# Patient Record
Sex: FEMALE | URBAN - NONMETROPOLITAN AREA
[De-identification: names, ages, dates, MRNs, and addresses within clinical notes are randomized per-mention and may not be internally consistent; named-entity substitution may affect disease eponyms.]

---

## 2024-10-10 ENCOUNTER — APPOINTMENT (RX ONLY)
Dept: URBAN - NONMETROPOLITAN AREA CLINIC 28 | Facility: CLINIC | Age: 24
Setting detail: DERMATOLOGY
End: 2024-10-10

## 2024-10-10 DIAGNOSIS — L20.89 OTHER ATOPIC DERMATITIS: ICD-10-CM | Status: INADEQUATELY CONTROLLED

## 2024-10-10 DIAGNOSIS — Z79.899 OTHER LONG TERM (CURRENT) DRUG THERAPY: ICD-10-CM

## 2024-10-10 PROCEDURE — 96372 THER/PROPH/DIAG INJ SC/IM: CPT

## 2024-10-10 PROCEDURE — ? DUPIXENT INITIATION

## 2024-10-10 PROCEDURE — 81025 URINE PREGNANCY TEST: CPT

## 2024-10-10 PROCEDURE — ? COUNSELING

## 2024-10-10 PROCEDURE — ? PRESCRIPTION

## 2024-10-10 PROCEDURE — ? URINE PREGNANCY TEST

## 2024-10-10 PROCEDURE — ? PRESCRIPTION MEDICATION MANAGEMENT

## 2024-10-10 PROCEDURE — 99202 OFFICE O/P NEW SF 15 MIN: CPT | Mod: 25

## 2024-10-10 PROCEDURE — ? DUPIXENT INJECTION

## 2024-10-10 RX ORDER — CRISABOROLE 20 MG/G
OINTMENT TOPICAL
Qty: 60 | Refills: 3 | Status: ERX | COMMUNITY
Start: 2024-10-10

## 2024-10-10 RX ORDER — DUPILUMAB 300 MG/2ML
INJECTION, SOLUTION SUBCUTANEOUS
Qty: 4 | Refills: 3 | Status: ERX | COMMUNITY
Start: 2024-10-10

## 2024-10-10 RX ADMIN — DUPILUMAB: 300 INJECTION, SOLUTION SUBCUTANEOUS at 00:00

## 2024-10-10 RX ADMIN — CRISABOROLE: 20 OINTMENT TOPICAL at 00:00

## 2024-10-10 ASSESSMENT — SEVERITY ASSESSMENT 2020: SEVERITY 2020: MODERATE

## 2024-10-10 ASSESSMENT — BSA ECZEMA: % BODY COVERED IN ECZEMA: 40

## 2024-10-10 ASSESSMENT — LOCATION DETAILED DESCRIPTION DERM
LOCATION DETAILED: RIGHT POPLITEAL SKIN
LOCATION DETAILED: RIGHT DISTAL POSTERIOR UPPER ARM
LOCATION DETAILED: RIGHT PROXIMAL POSTERIOR UPPER ARM
LOCATION DETAILED: LEFT ELBOW
LOCATION DETAILED: LEFT PROXIMAL POSTERIOR UPPER ARM
LOCATION DETAILED: LEFT POPLITEAL SKIN

## 2024-10-10 ASSESSMENT — LOCATION SIMPLE DESCRIPTION DERM
LOCATION SIMPLE: RIGHT POSTERIOR UPPER ARM
LOCATION SIMPLE: LEFT ELBOW
LOCATION SIMPLE: RIGHT POPLITEAL SKIN
LOCATION SIMPLE: LEFT POPLITEAL SKIN
LOCATION SIMPLE: LEFT POSTERIOR UPPER ARM

## 2024-10-10 ASSESSMENT — LOCATION ZONE DERM
LOCATION ZONE: ARM
LOCATION ZONE: LEG

## 2024-10-10 ASSESSMENT — ITCH NUMERIC RATING SCALE: HOW SEVERE IS YOUR ITCHING?: 8

## 2024-10-10 NOTE — PROCEDURE: DUPIXENT INJECTION
Date Of Next Injection: 2 Weeks
Consent: The risks of pain and injection site reactions were reviewed with the patient prior to the injection.
Lot # (Optional): [id number]
Render If Medication Purchased By Clinic In Visit Note?: Yes
Expiration Date (Optional): 2026-04-30
Syringe Size Used (Required For Enhanced Ndc): 300 mg/2ml prefilled pen
Was The Medication Purchased By The Clinic?: No
Dupixent Dosing: 300 mg
41880 Billing Preferences: 1
Detail Level: None
J-Code: 
Ndc (200 Mg Prefilled Pen): 6150-3138-09
Administered By (Optional): EPCCMA
Ndc (300 Mg Prefilled Pen): 1393-3692-14
Ndc (300 Mg Prefilled Syringe): 1968-2337-45
Ndc (200 Mg Prefilled Syringe): 9467-6108-11

## 2024-10-10 NOTE — PROCEDURE: PRESCRIPTION MEDICATION MANAGEMENT
Render In Strict Bullet Format?: No
Detail Level: Simple
Initiate Treatment: Eucrisa 2 % topical ointment \\nSig: Apply to affected areas of arms, legs, trunk once daily for 3-4 days. PRN \\n\\nDupixent 300 mg/2 mL subcutaneous pen injector \\nSig: Inject 300mg Sub Q every other week
Continue Regimen: T.c.s PRN for severe itching

## 2024-10-10 NOTE — PROCEDURE: DUPIXENT INITIATION
Diagnosis (Required): Atopic Dermatitis/Eczematous Dermatitis
Is Methotrexate Contraindicated?: Yes
Is Phototherapy Contraindicated?: No
Detail Level: Zone
Pregnancy And Lactation Warning Text: There have not been adverse fetal risks in women taking Dupixent while pregnant. It is unknown if this medication is excreted in breast milk.
Dupixent Dosing: 600 mg SC day 0 then 300 mg SC every other week
Dupixent Monitoring Guidelines: There is no laboratory monitoring requirement with Dupixent.

## 2024-10-10 NOTE — PROCEDURE: URINE PREGNANCY TEST
Performed By: JESSIE
Detail Level: None
Expiration Date (Optional): 2026-04-22
Urine Pregnancy Test Result: negative
Lot # (Optional): [id number]

## 2024-10-24 ENCOUNTER — APPOINTMENT (RX ONLY)
Dept: URBAN - NONMETROPOLITAN AREA CLINIC 28 | Facility: CLINIC | Age: 24
Setting detail: DERMATOLOGY
End: 2024-10-24

## 2024-10-24 DIAGNOSIS — L20.89 OTHER ATOPIC DERMATITIS: ICD-10-CM

## 2024-10-24 PROCEDURE — 96372 THER/PROPH/DIAG INJ SC/IM: CPT

## 2024-10-24 PROCEDURE — ? DUPIXENT INJECTION

## 2024-10-24 PROCEDURE — ? PRESCRIPTION MEDICATION MANAGEMENT

## 2024-10-24 ASSESSMENT — LOCATION DETAILED DESCRIPTION DERM: LOCATION DETAILED: RIGHT PROXIMAL POSTERIOR UPPER ARM

## 2024-10-24 ASSESSMENT — LOCATION ZONE DERM: LOCATION ZONE: ARM

## 2024-10-24 ASSESSMENT — LOCATION SIMPLE DESCRIPTION DERM: LOCATION SIMPLE: RIGHT POSTERIOR UPPER ARM

## 2024-10-24 NOTE — PROCEDURE: DUPIXENT INJECTION
Date Of Next Injection: 2 Weeks
Consent: The risks of pain and injection site reactions were reviewed with the patient prior to the injection.
Lot # (Optional): [id number]
Render If Medication Purchased By Clinic In Visit Note?: Yes
Expiration Date (Optional): 2026-04-30
Syringe Size Used (Required For Enhanced Ndc): 300 mg/2ml prefilled pen
Was The Medication Purchased By The Clinic?: No
Dupixent Dosing: 300 mg
64623 Billing Preferences: 1
Detail Level: None
J-Code: 
Ndc (200 Mg Prefilled Pen): 4528-2101-91
Treatment Number (Optional): 2
Administered By (Optional): ILYA
Ndc (300 Mg Prefilled Pen): 5792-3766-84
Ndc (300 Mg Prefilled Syringe): 5420-0736-19
Ndc (200 Mg Prefilled Syringe): 6673-1216-08

## 2024-11-19 RX ORDER — DUPILUMAB 300 MG/2ML
INJECTION, SOLUTION SUBCUTANEOUS
Qty: 4 | Refills: 3 | Status: ERX

## 2025-01-27 ENCOUNTER — APPOINTMENT (OUTPATIENT)
Dept: URBAN - NONMETROPOLITAN AREA CLINIC 28 | Facility: CLINIC | Age: 25
Setting detail: DERMATOLOGY
End: 2025-01-27

## 2025-01-27 DIAGNOSIS — L20.89 OTHER ATOPIC DERMATITIS: ICD-10-CM

## 2025-01-27 DIAGNOSIS — Z79.899 OTHER LONG TERM (CURRENT) DRUG THERAPY: ICD-10-CM

## 2025-01-27 PROCEDURE — 36415 COLL VENOUS BLD VENIPUNCTURE: CPT

## 2025-01-27 PROCEDURE — ? PRESCRIPTION MEDICATION MANAGEMENT

## 2025-01-27 PROCEDURE — 81025 URINE PREGNANCY TEST: CPT

## 2025-01-27 PROCEDURE — ? VENIPUNCTURE

## 2025-01-27 PROCEDURE — 99214 OFFICE O/P EST MOD 30 MIN: CPT

## 2025-01-27 PROCEDURE — ? DUPIXENT MONITORING

## 2025-01-27 PROCEDURE — ? PRESCRIPTION

## 2025-01-27 PROCEDURE — ? URINE PREGNANCY TEST

## 2025-01-27 PROCEDURE — ? RINVOQ INITIATION

## 2025-01-27 PROCEDURE — ? ORDER TESTS

## 2025-01-27 PROCEDURE — ? DIAGNOSIS COMMENT

## 2025-01-27 RX ORDER — UPADACITINIB 15 MG/1
TABLET, EXTENDED RELEASE ORAL
Qty: 30 | Refills: 6 | Status: ERX | COMMUNITY
Start: 2025-01-27

## 2025-01-27 RX ADMIN — UPADACITINIB: 15 TABLET, EXTENDED RELEASE ORAL at 00:00

## 2025-01-27 ASSESSMENT — LOCATION ZONE DERM
LOCATION ZONE: TRUNK
LOCATION ZONE: ARM

## 2025-01-27 ASSESSMENT — LOCATION SIMPLE DESCRIPTION DERM
LOCATION SIMPLE: RIGHT UPPER ARM
LOCATION SIMPLE: LOWER BACK

## 2025-01-27 ASSESSMENT — ITCH NUMERIC RATING SCALE: HOW SEVERE IS YOUR ITCHING?: 4

## 2025-01-27 ASSESSMENT — LOCATION DETAILED DESCRIPTION DERM
LOCATION DETAILED: SUPERIOR LUMBAR SPINE
LOCATION DETAILED: RIGHT ANTECUBITAL SKIN

## 2025-01-27 ASSESSMENT — SEVERITY ASSESSMENT 2020: SEVERITY 2020: MODERATE

## 2025-01-27 ASSESSMENT — BSA ECZEMA: % BODY COVERED IN ECZEMA: 10

## 2025-01-27 NOTE — PROCEDURE: URINE PREGNANCY TEST
Performed By: mary
Lot # (Optional): [id number]
Detail Level: None
Urine Pregnancy Test Result: negative
Expiration Date (Optional): 2026-04-22

## 2025-01-27 NOTE — PROCEDURE: PRESCRIPTION MEDICATION MANAGEMENT
Plan: preventative measures reviewed including measures to improve skin condition. These include soap, sensitive products. \\nAvoid heavy, perfumes, and chemicals. Moisturizing skin with a at least 1 to 2 times a day. \\nAvoid itching super long, super hot showers and getting overheated anything that might irritate the skin. \\nrecommend Zyrtec or Allegra daily.
Render In Strict Bullet Format?: No
Detail Level: Simple
Initiate Treatment: Rinvoq daily
Continue Regimen: T.c.s PRN for severe itching 1-2/day for up to 2 weeks \\n\\nEucrisa 2 % topical ointment  Sig: Apply to affected areas of arms, legs, trunk once daily for 3-4 days. PRN
Discontinue Regimen: Dupixent 300 mg/2 mL subcutaneous pen injector  Sig: Inject 300mg Sub Q every other week

## 2025-01-27 NOTE — PROCEDURE: RINVOQ INITIATION
Rinvoq Dosing: 15mg Daily
Is Methotrexate Contraindicated?: No
Pregnancy And Lactation Warning Text: Based on animal studies, Rinvoq may cause embryo-fetal harm when administered to pregnant women.  The medication should not be used in pregnancy.  Breastfeeding is not recommended during treatment and for 6 days after the last dose.
Diagnosis (Required): Atopic Dermatitis/Eczematous Dermatitis
Rinvoq Monitoring Guidelines: CBC, LFTs, lipids, hepatitis screening, TB screening and pregnancy tests should be checked prior to initiating Rinvoq therapy.  Labs may also be checked periodically after the initiation period.
Detail Level: Zone
Add Pregnancy And Lactation Warning To Medication Counseling?: Yes

## 2025-01-27 NOTE — PROCEDURE: VENIPUNCTURE
Bill [id number] For Specimen Handling/Conveyance To Laboratory?: no
Number Of Tubes Drawn: 3
Venipuncture Paragraph: An alcohol pad was applied to the venipuncture site. Venipuncture was performed using a butterfly needle. Pressure and a bandaid was applied to the site. No complications were noted.
Detail Level: None

## 2025-01-27 NOTE — PROCEDURE: DUPIXENT MONITORING
Comments: Will Discontinue due to still having rash post injection.
Add High Risk Medication Management Associated Diagnosis?: No
Length Of Therapy: 4 months
Detail Level: Zone

## 2025-01-27 NOTE — PROCEDURE: DIAGNOSIS COMMENT
Comment: Appearing to have flares and flushing to upper chest and neck within 24hr from dupixent injection each time. A/e noted and recommend discontinue dupixent and try alternate class of medication. IL 13 such as dupixent and ebglyss potential for a/e. Will get labs and start trial with rinvoq today. Last injection 2 weeks ago. \\nItch scale today 4/10 BSA 10, residual PIH to neck area still present.
Render Risk Assessment In Note?: no
Detail Level: Detailed

## 2025-02-11 RX ORDER — UPADACITINIB 15 MG/1
TABLET, EXTENDED RELEASE ORAL
Qty: 30 | Refills: 6 | Status: ERX

## 2025-02-28 ENCOUNTER — APPOINTMENT (OUTPATIENT)
Dept: URBAN - NONMETROPOLITAN AREA CLINIC 28 | Facility: CLINIC | Age: 25
Setting detail: DERMATOLOGY
End: 2025-02-28

## 2025-02-28 DIAGNOSIS — L20.89 OTHER ATOPIC DERMATITIS: ICD-10-CM

## 2025-02-28 DIAGNOSIS — Z79.899 OTHER LONG TERM (CURRENT) DRUG THERAPY: ICD-10-CM

## 2025-02-28 PROCEDURE — ? ORDER TESTS

## 2025-02-28 PROCEDURE — ? VENIPUNCTURE

## 2025-02-28 PROCEDURE — ? RINVOQ MONITORING

## 2025-02-28 PROCEDURE — ? DIAGNOSIS COMMENT

## 2025-02-28 PROCEDURE — 36415 COLL VENOUS BLD VENIPUNCTURE: CPT

## 2025-02-28 PROCEDURE — ? PRESCRIPTION MEDICATION MANAGEMENT

## 2025-02-28 PROCEDURE — 99214 OFFICE O/P EST MOD 30 MIN: CPT

## 2025-02-28 ASSESSMENT — SEVERITY ASSESSMENT 2020: SEVERITY 2020: ALMOST CLEAR

## 2025-02-28 ASSESSMENT — LOCATION SIMPLE DESCRIPTION DERM: LOCATION SIMPLE: LOWER BACK

## 2025-02-28 ASSESSMENT — ITCH NUMERIC RATING SCALE: HOW SEVERE IS YOUR ITCHING?: 0

## 2025-02-28 ASSESSMENT — LOCATION ZONE DERM: LOCATION ZONE: TRUNK

## 2025-02-28 ASSESSMENT — BSA ECZEMA: % BODY COVERED IN ECZEMA: 0

## 2025-02-28 ASSESSMENT — LOCATION DETAILED DESCRIPTION DERM: LOCATION DETAILED: SUPERIOR LUMBAR SPINE

## 2025-02-28 NOTE — PROCEDURE: RINVOQ MONITORING
Detail Level: Zone
Comments: Start Rinvoq 1/2025 tolerating well no a/e. Labs drawn today.
Current Dosage: 15mg Daily
Add High Risk Medication Management Associated Diagnosis?: No

## 2025-02-28 NOTE — PROCEDURE: VENIPUNCTURE
Bill [id number] For Specimen Handling/Conveyance To Laboratory?: no
Venipuncture Paragraph: An alcohol pad was applied to the venipuncture site. Venipuncture was performed using a butterfly needle. Pressure and a bandaid was applied to the site. No complications were noted.
Detail Level: None
Number Of Tubes Drawn: 2

## 2025-02-28 NOTE — PROCEDURE: PRESCRIPTION MEDICATION MANAGEMENT
Plan: preventative measures reviewed including measures to improve skin condition. These include soap, sensitive products. \\nAvoid heavy, perfumes, and chemicals. Moisturizing skin with a at least 1 to 2 times a day. \\nAvoid itching super long, super hot showers and getting overheated anything that might irritate the skin. \\nrecommend Zyrtec or Allegra daily.
Render In Strict Bullet Format?: No
Detail Level: Simple
Continue Regimen: Rinvoq daily\\n\\nT.c.s PRN for severe itching 1-2/day for up to 2 weeks \\n\\nEucrisa 2 % topical ointment  Sig: Apply to affected areas of arms, legs, trunk once daily for 3-4 days. PRN

## 2025-02-28 NOTE — PROCEDURE: MIPS QUALITY
Quality 47: Advance Care Plan: Advance Care Planning discussed and documented in the medical record; patient did not wish or was not able to name a surrogate decision maker or provide an advance care plan.
Quality 226: Preventive Care And Screening: Tobacco Use: Screening And Cessation Intervention: Patient screened for tobacco use and is an ex/non-smoker
Quality 486: Dermatitis - Improvement In Patient-Reported Itch Severity: Itch severity assessment score is reduced by 3 or more points from the initial (index) assessment score to the follow-up visit score
Quality 176: Tuberculosis Screening Prior To First Course Of Biologic And/Or Immune Response Modifier Therapy: Patient receiving first-time biologic and/or immune response modifier therapy, TB Screening Performed and Results Interpreted within 12 months
Detail Level: Detailed
Quality 431: Preventive Care And Screening: Unhealthy Alcohol Use - Screening: Patient not identified as an unhealthy alcohol user when screened for unhealthy alcohol use using a systematic screening method

## 2025-02-28 NOTE — PROCEDURE: ORDER TESTS
Bill For Surgical Tray: no
Performing Laboratory: 0
Expected Date Of Service: 02/28/2025
Billing Type: Third-Party Bill

## 2025-02-28 NOTE — PROCEDURE: DIAGNOSIS COMMENT
Comment: Overall improved AD on Rinvoq. Tolerating well with no a/e. BSA clear. Itch 0/10 not needing topicals at this time. F/u labs today.
Render Risk Assessment In Note?: no
Detail Level: Detailed